# Patient Record
Sex: FEMALE | Race: WHITE | Employment: OTHER | URBAN - METROPOLITAN AREA
[De-identification: names, ages, dates, MRNs, and addresses within clinical notes are randomized per-mention and may not be internally consistent; named-entity substitution may affect disease eponyms.]

---

## 2017-10-20 ENCOUNTER — HOSPITAL ENCOUNTER (OUTPATIENT)
Dept: MAMMOGRAPHY | Age: 52
Discharge: HOME | End: 2017-10-20
Payer: COMMERCIAL

## 2017-10-20 DIAGNOSIS — Z12.39 SCREENING BREAST EXAMINATION: ICD-10-CM

## 2017-10-20 PROCEDURE — 77063 BREAST TOMOSYNTHESIS BI: CPT

## 2019-05-23 ENCOUNTER — HOSPITAL ENCOUNTER (OUTPATIENT)
Dept: MAMMOGRAPHY | Age: 54
Discharge: HOME | End: 2019-05-23
Attending: INTERNAL MEDICINE
Payer: COMMERCIAL

## 2019-05-23 DIAGNOSIS — Z12.39 BREAST SCREENING: ICD-10-CM

## 2019-05-23 PROCEDURE — 77063 BREAST TOMOSYNTHESIS BI: CPT

## 2019-05-24 ENCOUNTER — HOSPITAL ENCOUNTER (OUTPATIENT)
Dept: NON INVASIVE DIAGNOSTICS | Age: 54
Discharge: HOME | End: 2019-05-24
Attending: INTERNAL MEDICINE
Payer: COMMERCIAL

## 2019-05-24 VITALS
DIASTOLIC BLOOD PRESSURE: 80 MMHG | WEIGHT: 151 LBS | SYSTOLIC BLOOD PRESSURE: 130 MMHG | BODY MASS INDEX: 25.78 KG/M2 | HEIGHT: 64 IN

## 2019-05-24 DIAGNOSIS — I31.9 DISEASE OF PERICARDIUM: ICD-10-CM

## 2019-05-24 LAB
ECHO AO ROOT DIAM: 2.9 CM
ECHO AV AREA PEAK VELOCITY: 2.7 CM2
ECHO AV PEAK GRADIENT: 6.4 MMHG
ECHO AV PEAK VELOCITY: 126.68 CM/S
ECHO EST RA PRESSURE: 5 MMHG
ECHO LA AREA 4C: 17.2 CM2
ECHO LA MAJOR AXIS: 2.82 CM
ECHO LA TO AORTIC ROOT RATIO: 0.97
ECHO LA VOL 2C: 41.95 ML (ref 22–52)
ECHO LA VOL 4C: 46.25 ML (ref 22–52)
ECHO LA VOL BP: 48.07 ML (ref 22–52)
ECHO LA VOL/BSA BIPLANE: 27.69 ML/M2 (ref 16–28)
ECHO LA VOLUME INDEX A2C: 24.17 ML/M2 (ref 16–28)
ECHO LA VOLUME INDEX A4C: 26.64 ML/M2 (ref 16–28)
ECHO LV E' LATERAL VELOCITY: 12.59 CM/S
ECHO LV E' SEPTAL VELOCITY: 10.05 CM/S
ECHO LV INTERNAL DIMENSION DIASTOLIC: 4.37 CM (ref 3.9–5.3)
ECHO LV INTERNAL DIMENSION SYSTOLIC: 2.82 CM
ECHO LV IVSD: 0.96 CM (ref 0.6–0.9)
ECHO LV MASS 2D: 157.4 G (ref 67–162)
ECHO LV MASS INDEX 2D: 90.7 G/M2 (ref 43–95)
ECHO LV POSTERIOR WALL DIASTOLIC: 0.95 CM (ref 0.6–0.9)
ECHO LVOT DIAM: 2.11 CM
ECHO LVOT PEAK GRADIENT: 3.9 MMHG
ECHO LVOT PEAK VELOCITY: 98.24 CM/S
ECHO MV A VELOCITY: 31.89 CM/S
ECHO MV AREA PHT: 4.3 CM2
ECHO MV E DECELERATION TIME (DT): 178.1 MS
ECHO MV E VELOCITY: 65.47 CM/S
ECHO MV E/A RATIO: 2.05
ECHO MV E/E' LATERAL: 5.2
ECHO MV E/E' RATIO (AVERAGED): 5.86
ECHO MV E/E' SEPTAL: 6.51
ECHO MV PRESSURE HALF TIME (PHT): 51.6 MS
ECHO PULMONARY ARTERY SYSTOLIC PRESSURE (PASP): 41.6 MMHG
ECHO PV MAX VELOCITY: 88.83 CM/S
ECHO PV PEAK GRADIENT: 3.2 MMHG
ECHO RIGHT VENTRICULAR SYSTOLIC PRESSURE (RVSP): 41.6 MMHG
ECHO RV INTERNAL DIMENSION: 4.56 CM
ECHO RV TAPSE: 2.57 CM (ref 1.5–2)
ECHO TV REGURGITANT MAX VELOCITY: 302.38 CM/S
ECHO TV REGURGITANT PEAK GRADIENT: 36.6 MMHG

## 2019-05-24 PROCEDURE — 93306 TTE W/DOPPLER COMPLETE: CPT

## 2019-06-06 ENCOUNTER — OFFICE VISIT (OUTPATIENT)
Dept: INTERNAL MEDICINE CLINIC | Age: 54
End: 2019-06-06

## 2019-06-06 VITALS
RESPIRATION RATE: 19 BRPM | SYSTOLIC BLOOD PRESSURE: 120 MMHG | DIASTOLIC BLOOD PRESSURE: 70 MMHG | BODY MASS INDEX: 25.73 KG/M2 | HEART RATE: 58 BPM | TEMPERATURE: 97.9 F | WEIGHT: 145.2 LBS | HEIGHT: 63 IN | OXYGEN SATURATION: 98 %

## 2019-06-06 DIAGNOSIS — Z12.11 COLON CANCER SCREENING: ICD-10-CM

## 2019-06-06 DIAGNOSIS — I83.811 VARICOSE VEINS OF RIGHT LOWER EXTREMITY WITH PAIN: ICD-10-CM

## 2019-06-06 DIAGNOSIS — Z12.4 CERVICAL CANCER SCREENING: ICD-10-CM

## 2019-06-06 DIAGNOSIS — R91.1 LUNG NODULE: ICD-10-CM

## 2019-06-06 DIAGNOSIS — Z84.1: ICD-10-CM

## 2019-06-06 DIAGNOSIS — R49.0 HOARSENESS OF VOICE: Primary | ICD-10-CM

## 2019-06-06 RX ORDER — ASPIRIN 81 MG/1
TABLET ORAL DAILY
COMMUNITY

## 2019-06-06 NOTE — PROGRESS NOTES
New Patient Evaluation    Sunny Cordero is a 48 y.o. female. They are here to establish care with the group and me as a primary care provider. she has seen Dr. Margi Cedillo in the past.  She then followed up with Dr. Raleigh Douglass. The patient has chronic pain. Pericarditis in 2016. She went to see Dr. Britney Cervantes. She had resolution of the pericarditis. She     She has had varicose veins in her right leg. Present her whole life. She has seen vascular in the past (Dr. Chemo Forbes). She had venous clot. She had a venous clot two months ago. She  Has a lung nodule--need repeat chest CT. She has some raspiness of her voice. She also notes having a lot of coughing only. Increased mucus production. Possible allergy. Patient Active Problem List    Diagnosis Date Noted    Menopause     Dyspareunia      Current Outpatient Medications   Medication Sig Dispense Refill    aspirin delayed-release 81 mg tablet Take  by mouth daily.  MULTIVITAMIN PO Take  by mouth daily.  CALCIUM CARBONATE/VITAMIN D3 (CALCIUM CHEW PO) Take  by mouth daily.  HYDROcodone-acetaminophen (NORCO) 7.5-325 mg per tablet Take 1 Tab by mouth every four (4) hours as needed for Pain. Max Daily Amount: 6 Tabs. 60 Tab 0    fluticasone-salmeterol (ADVAIR) 500-50 mcg/dose diskus inhaler Take 1 Puff by inhalation two (2) times a day.  1 Inhaler 0     Allergies   Allergen Reactions    Pcn [Penicillins] Other (comments)     Patient reports an reaction about 8 years ago       Past Medical History:   Diagnosis Date    Dyspareunia     Menopause     age 47     Past Surgical History:   Procedure Laterality Date    HX BREAST BIOPSY      can't remember which side needle benign     Family History   Problem Relation Age of Onset    Cancer Mother         lung    Cancer Father         salivary     Heart Disease Father     Lung Disease Father      Social History     Tobacco Use    Smoking status: Never Smoker    Smokeless tobacco: Never Used   Substance Use Topics    Alcohol use: No        Health Maintenance   Topic Date Due    Shingrix Vaccine Age 50> (1 of 2) 07/28/2017    FOBT Q 1 YEAR AGE 50-75  07/28/2017    PAP AKA CERVICAL CYTOLOGY  02/26/2019    Influenza Age 5 to Adult  08/01/2019    BREAST CANCER SCRN MAMMOGRAM  05/23/2021    DTaP/Tdap/Td series (2 - Td) 02/08/2026    Pneumococcal 0-64 years  Aged Out       Review of Systems   Constitutional: Negative. Respiratory: Negative. Cardiovascular: Negative. Genitourinary: Negative. Visit Vitals  /70 (BP 1 Location: Right arm, BP Patient Position: Sitting)   Pulse (!) 58   Temp 97.9 °F (36.6 °C) (Oral)   Resp 19   Ht 5' 3.15\" (1.604 m)   Wt 145 lb 3.2 oz (65.9 kg)   SpO2 98%   BMI 25.60 kg/m²       Physical Exam   Constitutional: She is oriented to person, place, and time. No distress. Cardiovascular: Normal rate and regular rhythm. No murmur heard. Pulmonary/Chest: Effort normal and breath sounds normal.   Neurological: She is alert and oriented to person, place, and time. ASSESSMENT/PLAN    Diagnoses and all orders for this visit:    1. Hoarseness of voice  -     REFERRAL TO ENT-OTOLARYNGOLOGY    2. Lung nodule  -     CT CHEST WO CONT; Future    3. Family history of kidney disease in sister  -     CBC WITH AUTOMATED DIFF  -     LIPID PANEL  -     METABOLIC PANEL, COMPREHENSIVE  -     TSH 3RD GENERATION    4. Colon cancer screening  -     REFERRAL TO GASTROENTEROLOGY    5. Varicose veins of right lower extremity with pain  -     REFERRAL TO VASCULAR SURGERY    6. Cervical cancer screening  -     REFERRAL TO GYNECOLOGY      Follow-up and Dispositions    · Return in about 6 weeks (around 7/18/2019) for Full Physical - 30 minutes appointment.             -Discussed with the patient to continue the current plan of care. We will obtain baseline labwork and determine if any adjustments need to be done.   We will also await the records of the previous PCP to ascertain further details of the patient's history. The patient agrees with and understands the plan of care. All questions have been answered.

## 2019-06-06 NOTE — PATIENT INSTRUCTIONS
Varicose Veins: Care Instructions Your Care Instructions Varicose veins are twisted, enlarged veins near the surface of the skin. They develop most often in the legs and ankles. Some people may be more likely than others to get varicose veins because of aging or hormone changes or because a parent has them. Being overweight or pregnant can make varicose veins worse. Jobs that require standing for long periods of time also can make them worse. Follow-up care is a key part of your treatment and safety. Be sure to make and go to all appointments, and call your doctor if you are having problems. It's also a good idea to know your test results and keep a list of the medicines you take. How can you care for yourself at home? · Wear compression stockings during the day to help relieve symptoms. They improve blood flow and are the main treatment for varicose veins. Talk to your doctor about which ones to get and where to get them. · Prop up your legs at or above the level of your heart when possible. This helps keep the blood from pooling in your lower legs and improves blood flow to the rest of your body. · Avoid sitting and standing for long periods. This puts added stress on your veins. · Get regular exercise, and control your weight. Walk, bicycle, or swim to improve blood flow in your legs. · If you bump your leg so hard that you know it is likely to bruise, prop up your leg and put ice or a cold pack on the area for 10 to 20 minutes at a time. Try to do this every 1 to 2 hours for the next 3 days (when you are awake) or until the swelling goes down. Put a thin cloth between the ice and your skin. · If you cut or scratch the skin over a vein, it may bleed a lot. Prop up your leg and apply firm pressure with a clean bandage over the site of the bleeding. Continue to apply pressure for a full 15 minutes. Do not check sooner to see if the bleeding has stopped.  If the bleeding has not stopped after 15 minutes, apply pressure again for another 15 minutes. You can repeat this up to 3 times for a total of 45 minutes. If you have a blood clot in a varicose vein, you may have tenderness and swelling over the vein. The vein may feel firm. Be sure to call your doctor right away if you have these symptoms. If your doctor has told you how to care for the clot, follow his or her instructions. Care may include the following: · Prop up your leg and apply heat with a warm, damp cloth or a heating pad set on low (put a towel or cloth between your leg and the heating pad to prevent burns). · Ask your doctor if you can take an over-the-counter pain medicine, such as acetaminophen (Tylenol), ibuprofen (Advil, Motrin), or naproxen (Aleve). Be safe with medicines. Read and follow all instructions on the label. When should you call for help? Call 911 anytime you think you may need emergency care. For example, call if: 
  · You have sudden chest pain and shortness of breath, or you cough up blood.  
 Call your doctor now or seek immediate medical care if: 
  · You have signs of a blood clot, such as: 
? Pain in your calf, back of the knee, thigh, or groin. ? Redness and swelling in your leg or groin.  
  · A varicose vein begins to bleed and you cannot stop it.  
  · You have a tender lump in your leg.  
  · You get an open sore.  
 Watch closely for changes in your health, and be sure to contact your doctor if: 
  · Your varicose vein symptoms do not improve with home treatment. Where can you learn more? Go to http://chelo-harriet.info/. Enter H277 in the search box to learn more about \"Varicose Veins: Care Instructions. \" Current as of: September 26, 2018 Content Version: 11.9 © 6990-2716 [employer]. Care instructions adapted under license by [employer] (which disclaims liability or warranty for this information).  If you have questions about a medical condition or this instruction, always ask your healthcare professional. [place] [address] any warranty or liability for your use of this information.

## 2019-06-08 LAB
ALBUMIN SERPL-MCNC: 4.4 G/DL (ref 3.5–5.5)
ALBUMIN/GLOB SERPL: 2 {RATIO} (ref 1.2–2.2)
ALP SERPL-CCNC: 83 IU/L (ref 39–117)
ALT SERPL-CCNC: 26 IU/L (ref 0–32)
AST SERPL-CCNC: 29 IU/L (ref 0–40)
BASOPHILS # BLD AUTO: 0 X10E3/UL (ref 0–0.2)
BASOPHILS NFR BLD AUTO: 1 %
BILIRUB SERPL-MCNC: 0.5 MG/DL (ref 0–1.2)
BUN SERPL-MCNC: 11 MG/DL (ref 6–24)
BUN/CREAT SERPL: 14 (ref 9–23)
CALCIUM SERPL-MCNC: 9.6 MG/DL (ref 8.7–10.2)
CHLORIDE SERPL-SCNC: 103 MMOL/L (ref 96–106)
CHOLEST SERPL-MCNC: 229 MG/DL (ref 100–199)
CO2 SERPL-SCNC: 24 MMOL/L (ref 20–29)
CREAT SERPL-MCNC: 0.78 MG/DL (ref 0.57–1)
EOSINOPHIL # BLD AUTO: 0.1 X10E3/UL (ref 0–0.4)
EOSINOPHIL NFR BLD AUTO: 2 %
ERYTHROCYTE [DISTWIDTH] IN BLOOD BY AUTOMATED COUNT: 13.7 % (ref 12.3–15.4)
GLOBULIN SER CALC-MCNC: 2.2 G/DL (ref 1.5–4.5)
GLUCOSE SERPL-MCNC: 86 MG/DL (ref 65–99)
HCT VFR BLD AUTO: 38.5 % (ref 34–46.6)
HDLC SERPL-MCNC: 57 MG/DL
HGB BLD-MCNC: 12.8 G/DL (ref 11.1–15.9)
IMM GRANULOCYTES # BLD AUTO: 0 X10E3/UL (ref 0–0.1)
IMM GRANULOCYTES NFR BLD AUTO: 0 %
LDLC SERPL CALC-MCNC: 155 MG/DL (ref 0–99)
LYMPHOCYTES # BLD AUTO: 1.5 X10E3/UL (ref 0.7–3.1)
LYMPHOCYTES NFR BLD AUTO: 36 %
MCH RBC QN AUTO: 29.3 PG (ref 26.6–33)
MCHC RBC AUTO-ENTMCNC: 33.2 G/DL (ref 31.5–35.7)
MCV RBC AUTO: 88 FL (ref 79–97)
MONOCYTES # BLD AUTO: 0.2 X10E3/UL (ref 0.1–0.9)
MONOCYTES NFR BLD AUTO: 6 %
NEUTROPHILS # BLD AUTO: 2.4 X10E3/UL (ref 1.4–7)
NEUTROPHILS NFR BLD AUTO: 55 %
PLATELET # BLD AUTO: 305 X10E3/UL (ref 150–450)
POTASSIUM SERPL-SCNC: 4.2 MMOL/L (ref 3.5–5.2)
PROT SERPL-MCNC: 6.6 G/DL (ref 6–8.5)
RBC # BLD AUTO: 4.37 X10E6/UL (ref 3.77–5.28)
SODIUM SERPL-SCNC: 141 MMOL/L (ref 134–144)
TRIGL SERPL-MCNC: 87 MG/DL (ref 0–149)
TSH SERPL DL<=0.005 MIU/L-ACNC: 3.74 UIU/ML (ref 0.45–4.5)
VLDLC SERPL CALC-MCNC: 17 MG/DL (ref 5–40)
WBC # BLD AUTO: 4.3 X10E3/UL (ref 3.4–10.8)

## 2019-06-21 ENCOUNTER — TELEPHONE (OUTPATIENT)
Dept: INTERNAL MEDICINE CLINIC | Age: 54
End: 2019-06-21

## 2019-06-21 NOTE — TELEPHONE ENCOUNTER
Spoke with Lucille with [employer]. [facility] for [facility] scan is out network. Need to redirect to another facility or clinical reason why test is needed. . Insurance will approve if test done at [facility]. Authorization will be faxed or call with approval codes. At that time can call and schedule appointment.     Patient notified

## 2019-08-01 ENCOUNTER — OFFICE VISIT (OUTPATIENT)
Dept: INTERNAL MEDICINE CLINIC | Age: 54
End: 2019-08-01

## 2019-08-01 VITALS
DIASTOLIC BLOOD PRESSURE: 78 MMHG | HEIGHT: 63 IN | BODY MASS INDEX: 25.8 KG/M2 | HEART RATE: 78 BPM | RESPIRATION RATE: 16 BRPM | SYSTOLIC BLOOD PRESSURE: 104 MMHG | TEMPERATURE: 98 F | OXYGEN SATURATION: 97 % | WEIGHT: 145.6 LBS

## 2019-08-01 DIAGNOSIS — R07.81 RIB PAIN ON LEFT SIDE: Primary | ICD-10-CM

## 2019-08-01 RX ORDER — GABAPENTIN 100 MG/1
100 CAPSULE ORAL 3 TIMES DAILY
Qty: 90 CAP | Refills: 0 | Status: SHIPPED | OUTPATIENT
Start: 2019-08-01

## 2019-08-01 NOTE — PROGRESS NOTES
Chief Complaint   Patient presents with    Rib Pain     left side under breast     1. Have you been to the ER, urgent care clinic since your last visit? Hospitalized since your last visit? No    2. Have you seen or consulted any other health care providers outside of the [address] [place] [address] since your last visit? Include any pap smears or colon screening. No    complains of left side rib pain under breast. Difficulty taking deep breath. Sharp pain. Tried Advil, OTC Ibuprofen.  Cough-mucus/thick yellow

## 2019-08-01 NOTE — PROGRESS NOTES
Acute Care Note    Judith Mendoza is 48 y.o. female. she presents for evaluation of Rib Pain (left side under breast)      The patient has ongoing and persistent L sided rib pain. She notes that this has been ongoing and off and on for several years. She has had a pericardial effusion in the past however she has been followed by cardiology with an echo done in late May with no evidence of effusion. She has had complaints of chest pain since at least 2015. Prior to Admission medications    Medication Sig Start Date End Date Taking? Authorizing Provider   MULTIVITAMIN PO Take  by mouth daily. Yes Provider, Historical   CALCIUM CARBONATE/VITAMIN D3 (CALCIUM CHEW PO) Take  by mouth daily. Yes Provider, Historical   aspirin delayed-release 81 mg tablet Take  by mouth daily. Provider, Historical   HYDROcodone-acetaminophen (NORCO) 7.5-325 mg per tablet Take 1 Tab by mouth every four (4) hours as needed for Pain. Max Daily Amount: 6 Tabs. 10/10/16   Danita Oliver MD   fluticasone-salmeterol (ADVAIR) 500-50 mcg/dose diskus inhaler Take 1 Puff by inhalation two (2) times a day. 5/9/16   Danita Oliver MD         Patient Active Problem List   Diagnosis Code    Menopause Z78.0    Dyspareunia [id number]         Review of Systems   Constitutional: Negative. Respiratory: Negative. Cardiovascular: Positive for chest pain. Gastrointestinal: Negative. Visit Vitals  /78 (BP 1 Location: Right arm, BP Patient Position: Sitting)   Pulse 78   Temp 98 °F (36.7 °C) (Oral)   Resp 16   Ht 5' 3.15\" (1.604 m)   Wt 145 lb 9.6 oz (66 kg)   SpO2 97%   BMI 25.67 kg/m²       Physical Exam   Constitutional: She appears well-developed and well-nourished. Cardiovascular: Normal rate and regular rhythm. Pulmonary/Chest: Effort normal and breath sounds normal.   Neurological: She is alert. ASSESSMENT/PLAN  Diagnoses and all orders for this visit:    1.  Rib pain on left side  -     XR CHEST PA LAT; Future  -     gabapentin (NEURONTIN) 100 mg capsule; Take 1 Cap by mouth three (3) times daily. Max Daily Amount: 300 mg. Advised the patient to call back or return to office if symptoms worsen/change/persist.   Discussed expected course/resolution/complications of diagnosis in detail with patient. Medication risks/benefits/costs/interactions/alternatives discussed with patient. The patient was given an after visit summary which includes diagnoses, current medications, & vitals. They expressed understanding with the diagnosis and plan.

## 2019-08-06 ENCOUNTER — TELEPHONE (OUTPATIENT)
Dept: INTERNAL MEDICINE CLINIC | Age: 54
End: 2019-08-06

## 2019-08-06 NOTE — TELEPHONE ENCOUNTER
Confirmed with patient CT for tomorrow approved per insurance. Patient would like to know if she need to start cholesterol medication per lab results. Informed patient will forward and call back. Confirmed CPE 09/30/2019 @ 3pm.Pt verbalized understanding.

## 2019-08-06 NOTE — PROGRESS NOTES
Spoke with scheduling, correct test ordered and authorization received from insurance. Patient scheduled for tomorrow. Tried to notify patient.  No answer, JASON

## 2019-08-07 ENCOUNTER — HOSPITAL ENCOUNTER (OUTPATIENT)
Dept: CT IMAGING | Age: 54
Discharge: HOME | End: 2019-08-07
Payer: COMMERCIAL

## 2019-08-07 DIAGNOSIS — R91.1 LUNG NODULE: ICD-10-CM

## 2019-08-07 PROCEDURE — 71250 CT THORAX DX C-: CPT

## 2019-08-14 ENCOUNTER — TELEPHONE (OUTPATIENT)
Dept: INTERNAL MEDICINE CLINIC | Age: 54
End: 2019-08-14

## 2019-08-14 NOTE — TELEPHONE ENCOUNTER
Patient requesting results. informed patient per provider results sent through [address] today. Advised to reach out to insurance to find pulmonary in-network to schedule for further evaluation. Patient verbalized understanding.

## 2019-09-13 ENCOUNTER — TELEPHONE (OUTPATIENT)
Dept: INTERNAL MEDICINE CLINIC | Age: 54
End: 2019-09-13

## 2019-09-13 NOTE — TELEPHONE ENCOUNTER
Patient explained squirrel nibble on 's thumb and wanted to know if need evaluation. Patient reached out to animal control and confirmed rabies not known to squirrels unless in contact with wildlife. Advised patient if  develops, swelling, numbness, redness, itching, fever, go to ER. She confirmed tetanus received in last 10 years.

## 2019-09-19 ENCOUNTER — APPOINTMENT (OUTPATIENT)
Dept: GENERAL RADIOLOGY | Age: 54
End: 2019-09-19
Attending: STUDENT IN AN ORGANIZED HEALTH CARE EDUCATION/TRAINING PROGRAM
Payer: COMMERCIAL

## 2019-09-19 ENCOUNTER — HOSPITAL ENCOUNTER (EMERGENCY)
Age: 54
Discharge: HOME | End: 2019-09-19
Attending: STUDENT IN AN ORGANIZED HEALTH CARE EDUCATION/TRAINING PROGRAM
Payer: COMMERCIAL

## 2019-09-19 VITALS
DIASTOLIC BLOOD PRESSURE: 103 MMHG | OXYGEN SATURATION: 100 % | TEMPERATURE: 97.6 F | RESPIRATION RATE: 20 BRPM | HEART RATE: 79 BPM | SYSTOLIC BLOOD PRESSURE: 118 MMHG | WEIGHT: 139.77 LBS | BODY MASS INDEX: 24.64 KG/M2

## 2019-09-19 DIAGNOSIS — S52.502A CLOSED FRACTURE OF DISTAL END OF LEFT RADIUS, UNSPECIFIED FRACTURE MORPHOLOGY, INITIAL ENCOUNTER: Primary | ICD-10-CM

## 2019-09-19 DIAGNOSIS — S52.612A CLOSED DISPLACED FRACTURE OF STYLOID PROCESS OF LEFT ULNA, INITIAL ENCOUNTER: ICD-10-CM

## 2019-09-19 PROCEDURE — 99284 EMERGENCY DEPT VISIT MOD MDM: CPT

## 2019-09-19 PROCEDURE — 74011250636 HC RX REV CODE- 250/636: Performed by: STUDENT IN AN ORGANIZED HEALTH CARE EDUCATION/TRAINING PROGRAM

## 2019-09-19 PROCEDURE — 75810000053 HC SPLINT APPLICATION

## 2019-09-19 PROCEDURE — 96372 THER/PROPH/DIAG INJ SC/IM: CPT

## 2019-09-19 PROCEDURE — A4565 SLINGS: HCPCS

## 2019-09-19 PROCEDURE — 73110 X-RAY EXAM OF WRIST: CPT

## 2019-09-19 PROCEDURE — 99283 EMERGENCY DEPT VISIT LOW MDM: CPT

## 2019-09-19 PROCEDURE — 74011250637 HC RX REV CODE- 250/637: Performed by: STUDENT IN AN ORGANIZED HEALTH CARE EDUCATION/TRAINING PROGRAM

## 2019-09-19 RX ORDER — HYDROCODONE BITARTRATE AND ACETAMINOPHEN 7.5; 325 MG/1; MG/1
1 TABLET ORAL ONCE
Status: COMPLETED | OUTPATIENT
Start: 2019-09-19 | End: 2019-09-19

## 2019-09-19 RX ORDER — HYDROCODONE BITARTRATE AND ACETAMINOPHEN 5; 325 MG/1; MG/1
1 TABLET ORAL
Qty: 12 TAB | Refills: 0 | Status: SHIPPED | OUTPATIENT
Start: 2019-09-19 | End: 2019-09-22

## 2019-09-19 RX ORDER — ACETAMINOPHEN 325 MG/1
650 TABLET ORAL ONCE
Status: COMPLETED | OUTPATIENT
Start: 2019-09-19 | End: 2019-09-19

## 2019-09-19 RX ORDER — ONDANSETRON 4 MG/1
4 TABLET, ORALLY DISINTEGRATING ORAL
Qty: 9 TAB | Refills: 0 | Status: SHIPPED | OUTPATIENT
Start: 2019-09-19

## 2019-09-19 RX ORDER — KETOROLAC TROMETHAMINE 30 MG/ML
30 INJECTION, SOLUTION INTRAMUSCULAR; INTRAVENOUS ONCE
Status: COMPLETED | OUTPATIENT
Start: 2019-09-19 | End: 2019-09-19

## 2019-09-19 RX ADMIN — HYDROCODONE BITARTRATE AND ACETAMINOPHEN 1 TABLET: 7.5; 325 TABLET ORAL at 11:10

## 2019-09-19 RX ADMIN — KETOROLAC TROMETHAMINE 30 MG: 30 INJECTION, SOLUTION INTRAMUSCULAR; INTRAVENOUS at 12:20

## 2019-09-19 RX ADMIN — ACETAMINOPHEN 650 MG: 325 TABLET, FILM COATED ORAL at 12:20

## 2019-09-19 NOTE — DISCHARGE INSTRUCTIONS
Patient Education        Broken Arm: Care Instructions  Your Care Instructions  Fractures can range from a small, hairline crack, to a bone or bones broken into two or more pieces. Your treatment depends on how bad the break is. Your doctor may have put your arm in a splint or cast to allow it to heal or to keep it stable until you see another doctor. It may take weeks or months for your arm to heal. You can help your arm heal with some care at home. You heal best when you take good care of yourself. Eat a variety of healthy foods, and don't smoke. You may have had a sedative to help you relax. You may be unsteady after having sedation. It can take a few hours for the medicine's effects to wear off. Common side effects of sedation include nausea, vomiting, and feeling sleepy or tired. The doctor has checked you carefully, but problems can develop later. If you notice any problems or new symptoms, get medical treatment right away. Follow-up care is a key part of your treatment and safety. Be sure to make and go to all appointments, and call your doctor if you are having problems. It's also a good idea to know your test results and keep a list of the medicines you take. How can you care for yourself at home? · If the doctor gave you a sedative:  ? For 24 hours, don't do anything that requires attention to detail, such as going to work, making important decisions, or signing any legal documents. It takes time for the medicine's effects to completely wear off.  ? For your safety, do not drive or operate any machinery that could be dangerous. Wait until the medicine wears off and you can think clearly and react easily. · Put ice or a cold pack on your arm for 10 to 20 minutes at a time. Try to do this every 1 to 2 hours for the next 3 days (when you are awake). Put a thin cloth between the ice and your cast or splint. Keep the cast or splint dry. · Follow the cast care instructions your doctor gives you.  If you have a splint, do not take it off unless your doctor tells you to. · Be safe with medicines. Take pain medicines exactly as directed. ? If the doctor gave you a prescription medicine for pain, take it as prescribed. ? If you are not taking a prescription pain medicine, ask your doctor if you can take an over-the-counter medicine. · Prop up your arm on pillows when you sit or lie down in the first few days after the injury. Keep the arm higher than the level of your heart. This will help reduce swelling. · Follow instructions for exercises to keep your arm strong. · Wiggle your fingers and wrist often to reduce swelling and stiffness. When should you call for help? Call 911 anytime you think you may need emergency care. For example, call if:    · You are very sleepy and you have trouble waking up.    Call your doctor now or seek immediate medical care if:    · You have new or worse nausea or vomiting.     · You have new or worse pain.     · Your hand or fingers are cool or pale or change color.     · Your cast or splint feels too tight.     · You have tingling, weakness, or numbness in your hand or fingers.    Watch closely for changes in your health, and be sure to contact your doctor if:    · You do not get better as expected.     · You have problems with your cast or splint. Where can you learn more? Go to http://chelo-harriet.info/. Enter P157 in the search box to learn more about \"Broken Arm: Care Instructions. \"  Current as of: September 20, 2018  Content Version: 12.1  © 0878-8680 [employer]. Care instructions adapted under license by [employer] (which disclaims liability or warranty for this information). If you have questions about a medical condition or this instruction, always ask your healthcare professional. [address] any warranty or liability for your use of this information.

## 2019-09-19 NOTE — ED PROVIDER NOTES
Patient is a 42-year-old female with no significant past medical history who resents to the emergency department with a chief complaint of left wrist pain. She had a mechanical fall in her kitchen approximately 30 minutes ago, landing outstretched on her left hand. Since then severe pain and unable to fully move the wrist secondary to pain. No previous injuries to the left wrist.  Rest has not helped. She did not take anything at home prior to arrival.  Pain currently 10 out of 10. No other injuries reported including head injury, neck pain, pain in other extremities. Fall   Pertinent negatives include no abdominal pain, no vomiting and no headaches.         Past Medical History:   Diagnosis Date    Dyspareunia     Menopause     age 47       Past Surgical History:   Procedure Laterality Date    HX BREAST BIOPSY      can't remember which side needle benign         Family History:   Problem Relation Age of Onset    Cancer Mother         lung    Cancer Father         salivary     Heart Disease Father     Lung Disease Father        Social History     Socioeconomic History    Marital status:      Spouse name: Not on file    Number of children: Not on file    Years of education: Not on file    Highest education level: Not on file   Occupational History    Not on file   Social Needs    Financial resource strain: Not on file    Food insecurity:     Worry: Not on file     Inability: Not on file    Transportation needs:     Medical: Not on file     Non-medical: Not on file   Tobacco Use    Smoking status: Never Smoker    Smokeless tobacco: Never Used   Substance and Sexual Activity    Alcohol use: No    Drug use: No    Sexual activity: Never     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   Lifestyle    Physical activity:     Days per week: Not on file     Minutes per session: Not on file    Stress: Not on file   Relationships    Social connections:     Talks on phone: Not on file     Gets together: Not on file     Attends Jew service: Not on file     Active member of club or organization: Not on file     Attends meetings of clubs or organizations: Not on file     Relationship status: Not on file    Intimate partner violence:     Fear of current or ex partner: Not on file     Emotionally abused: Not on file     Physically abused: Not on file     Forced sexual activity: Not on file   Other Topics Concern    Not on file   Social History Narrative    Not on file         ALLERGIES: Pcn [penicillins]    Review of Systems   Respiratory: Negative for cough and shortness of breath. Cardiovascular: Negative for chest pain. Gastrointestinal: Negative for abdominal pain and vomiting. Musculoskeletal: Positive for arthralgias and joint swelling. Negative for back pain. Skin: Negative for wound. Neurological: Negative for weakness and headaches. Psychiatric/Behavioral: Negative for confusion. All other systems reviewed and are negative. Vitals:    09/19/19 1059   BP: (!) 118/103   Pulse: 79   Resp: 20   Temp: 97.6 °F (36.4 °C)   SpO2: 100%   Weight: 63.4 kg (139 lb 12.4 oz)            Physical Exam   Constitutional: She is oriented to person, place, and time. She appears well-developed. No distress. HENT:   Head: Normocephalic and atraumatic. Right Ear: External ear normal.   Left Ear: External ear normal.   Nose: Nose normal.   Mouth/Throat: Oropharynx is clear and moist.   Neck: Normal range of motion. Neck supple. Cardiovascular: Normal rate, regular rhythm and normal heart sounds. Pulmonary/Chest: Effort normal and breath sounds normal. No respiratory distress. Abdominal: Soft. There is no tenderness. There is no guarding. Musculoskeletal:        Left wrist: She exhibits decreased range of motion, tenderness, bony tenderness, swelling and deformity. She exhibits no laceration. Neurological: She is alert and oriented to person, place, and time.  She exhibits normal muscle tone. Skin: Skin is warm and dry. [facility]       Procedures    Assessment and plan: This is a 57-year-old female who injured her left wrist after a ground-level fall, likely fracturing her distal radius. Will obtain x-ray to confirm. Immobilization, pain control, Ortho consultation. 11:46 AM  I consulted Dr. Charmaine Meyers surgeon on-call, and we discussed the case via [facility]. He recommends immobilization in splint and follow up in the office with Dr. Carlos Acharya. Splint applied by RN and evaluated by Jazmine Rand MD.  Neurovascular status intact post splint application.  Desired position maintained.

## 2019-09-25 ENCOUNTER — ANESTHESIA (OUTPATIENT)
Dept: MEDSURG UNIT | Age: 54
End: 2019-09-25
Payer: COMMERCIAL

## 2019-09-25 ENCOUNTER — HOSPITAL ENCOUNTER (OUTPATIENT)
Age: 54
Setting detail: OUTPATIENT SURGERY
Discharge: HOME | End: 2019-09-25
Attending: ORTHOPAEDIC SURGERY | Admitting: ORTHOPAEDIC SURGERY
Payer: COMMERCIAL

## 2019-09-25 ENCOUNTER — ANESTHESIA EVENT (OUTPATIENT)
Dept: MEDSURG UNIT | Age: 54
End: 2019-09-25
Payer: COMMERCIAL

## 2019-09-25 VITALS
BODY MASS INDEX: 25.69 KG/M2 | RESPIRATION RATE: 14 BRPM | TEMPERATURE: 97.7 F | HEIGHT: 63 IN | WEIGHT: 145 LBS | SYSTOLIC BLOOD PRESSURE: 132 MMHG | HEART RATE: 60 BPM | DIASTOLIC BLOOD PRESSURE: 81 MMHG | OXYGEN SATURATION: 100 %

## 2019-09-25 DIAGNOSIS — S52.502A CLOSED FRACTURE OF DISTAL END OF LEFT RADIUS, INITIAL ENCOUNTER: Primary | ICD-10-CM

## 2019-09-25 LAB — HGB BLD-MCNC: 12.9 G/DL (ref 11.5–16)

## 2019-09-25 PROCEDURE — 77030008829 HC WRE K ACMD -B: Performed by: ORTHOPAEDIC SURGERY

## 2019-09-25 PROCEDURE — 76210000034 HC AMBSU PH I REC 0.5 TO 1 HR: Performed by: ORTHOPAEDIC SURGERY

## 2019-09-25 PROCEDURE — C1713 ANCHOR/SCREW BN/BN,TIS/BN: HCPCS | Performed by: ORTHOPAEDIC SURGERY

## 2019-09-25 PROCEDURE — 76030000001 HC AMB SURG OR TIME 1 TO 1.5: Performed by: ORTHOPAEDIC SURGERY

## 2019-09-25 PROCEDURE — 76060000062 HC AMB SURG ANES 1 TO 1.5 HR: Performed by: ORTHOPAEDIC SURGERY

## 2019-09-25 PROCEDURE — 77030031139 HC SUT VCRL2 J&J -A: Performed by: ORTHOPAEDIC SURGERY

## 2019-09-25 PROCEDURE — 77030040361 HC SLV COMPR DVT MDII -B: Performed by: ORTHOPAEDIC SURGERY

## 2019-09-25 PROCEDURE — 77030018836 HC SOL IRR NACL ICUM -A: Performed by: ORTHOPAEDIC SURGERY

## 2019-09-25 PROCEDURE — 77030020782 HC GWN BAIR PAWS FLX 3M -B

## 2019-09-25 PROCEDURE — 74011000272 HC RX REV CODE- 272: Performed by: ORTHOPAEDIC SURGERY

## 2019-09-25 PROCEDURE — 77030003737 HC BIT DRL ACMD -C: Performed by: ORTHOPAEDIC SURGERY

## 2019-09-25 PROCEDURE — 74011250636 HC RX REV CODE- 250/636

## 2019-09-25 PROCEDURE — 85018 HEMOGLOBIN: CPT

## 2019-09-25 PROCEDURE — 74011250636 HC RX REV CODE- 250/636: Performed by: ANESTHESIOLOGY

## 2019-09-25 PROCEDURE — 74011250636 HC RX REV CODE- 250/636: Performed by: NURSE ANESTHETIST, CERTIFIED REGISTERED

## 2019-09-25 PROCEDURE — 77030003601 HC NDL NRV BLK BBMI -A

## 2019-09-25 PROCEDURE — 74011000250 HC RX REV CODE- 250: Performed by: ORTHOPAEDIC SURGERY

## 2019-09-25 PROCEDURE — 77030002916 HC SUT ETHLN J&J -A: Performed by: ORTHOPAEDIC SURGERY

## 2019-09-25 PROCEDURE — 74011250637 HC RX REV CODE- 250/637: Performed by: ANESTHESIOLOGY

## 2019-09-25 PROCEDURE — 77030033681 HC SPLNT P-CUT SAF BSNM -A: Performed by: ORTHOPAEDIC SURGERY

## 2019-09-25 PROCEDURE — 77030040356 HC CORD BPLR FRCP COVD -A: Performed by: ORTHOPAEDIC SURGERY

## 2019-09-25 PROCEDURE — A4565 SLINGS: HCPCS

## 2019-09-25 PROCEDURE — 77030020754 HC CUF TRNQT 2BLA STRY -B: Performed by: ORTHOPAEDIC SURGERY

## 2019-09-25 DEVICE — 2.3MM X 18MM LOCKING CORTICAL SCREW
Type: IMPLANTABLE DEVICE | Site: WRIST | Status: FUNCTIONAL
Brand: ACUMED

## 2019-09-25 DEVICE — ACU-LOC® 2 VDR PROX PLT, NARROW, L
Type: IMPLANTABLE DEVICE | Site: WRIST | Status: FUNCTIONAL
Brand: ACUMED

## 2019-09-25 DEVICE — 2.3MM X 20MM LOCKING CORTICAL SCREW
Type: IMPLANTABLE DEVICE | Site: WRIST | Status: FUNCTIONAL
Brand: ACUMED

## 2019-09-25 DEVICE — 2.3MM X 18MM LOCKING CORTICAL PEG
Type: IMPLANTABLE DEVICE | Site: WRIST | Status: FUNCTIONAL
Brand: ACUMED

## 2019-09-25 DEVICE — 3.5MM X 10MM NON-LOCKING HEXALOBE SCREW
Type: IMPLANTABLE DEVICE | Site: WRIST | Status: FUNCTIONAL
Brand: ACUMED

## 2019-09-25 DEVICE — 3.5MM X 12MM LOCKING HEXALOBE SCREW
Type: IMPLANTABLE DEVICE | Site: WRIST | Status: FUNCTIONAL
Brand: ACUMED

## 2019-09-25 DEVICE — 3.5MM X 10MM LOCKING HEXALOBE SCREW
Type: IMPLANTABLE DEVICE | Site: WRIST | Status: FUNCTIONAL
Brand: ACUMED

## 2019-09-25 RX ORDER — SODIUM CHLORIDE 9 MG/ML
25 INJECTION, SOLUTION INTRAVENOUS CONTINUOUS
Status: DISCONTINUED | OUTPATIENT
Start: 2019-09-25 | End: 2019-09-25 | Stop reason: HOSPADM

## 2019-09-25 RX ORDER — HYDROMORPHONE HYDROCHLORIDE 1 MG/ML
0.2 INJECTION, SOLUTION INTRAMUSCULAR; INTRAVENOUS; SUBCUTANEOUS
Status: DISCONTINUED | OUTPATIENT
Start: 2019-09-25 | End: 2019-09-25 | Stop reason: HOSPADM

## 2019-09-25 RX ORDER — PROPOFOL 10 MG/ML
INJECTION, EMULSION INTRAVENOUS AS NEEDED
Status: DISCONTINUED | OUTPATIENT
Start: 2019-09-25 | End: 2019-09-25 | Stop reason: HOSPADM

## 2019-09-25 RX ORDER — FENTANYL CITRATE 50 UG/ML
INJECTION, SOLUTION INTRAMUSCULAR; INTRAVENOUS AS NEEDED
Status: DISCONTINUED | OUTPATIENT
Start: 2019-09-25 | End: 2019-09-25 | Stop reason: HOSPADM

## 2019-09-25 RX ORDER — SODIUM CHLORIDE, SODIUM LACTATE, POTASSIUM CHLORIDE, CALCIUM CHLORIDE 600; 310; 30; 20 MG/100ML; MG/100ML; MG/100ML; MG/100ML
INJECTION, SOLUTION INTRAVENOUS
Status: DISCONTINUED | OUTPATIENT
Start: 2019-09-25 | End: 2019-09-25 | Stop reason: HOSPADM

## 2019-09-25 RX ORDER — MIDAZOLAM HYDROCHLORIDE 1 MG/ML
0.5 INJECTION, SOLUTION INTRAMUSCULAR; INTRAVENOUS
Status: DISCONTINUED | OUTPATIENT
Start: 2019-09-25 | End: 2019-09-25 | Stop reason: HOSPADM

## 2019-09-25 RX ORDER — ROPIVACAINE HYDROCHLORIDE 5 MG/ML
INJECTION, SOLUTION EPIDURAL; INFILTRATION; PERINEURAL
Status: COMPLETED | OUTPATIENT
Start: 2019-09-25 | End: 2019-09-25

## 2019-09-25 RX ORDER — MORPHINE SULFATE 10 MG/ML
2 INJECTION, SOLUTION INTRAMUSCULAR; INTRAVENOUS
Status: DISCONTINUED | OUTPATIENT
Start: 2019-09-25 | End: 2019-09-25 | Stop reason: HOSPADM

## 2019-09-25 RX ORDER — HYDROCODONE BITARTRATE AND ACETAMINOPHEN 5; 325 MG/1; MG/1
1 TABLET ORAL AS NEEDED
Status: DISCONTINUED | OUTPATIENT
Start: 2019-09-25 | End: 2019-09-25 | Stop reason: HOSPADM

## 2019-09-25 RX ORDER — MIDAZOLAM HYDROCHLORIDE 1 MG/ML
1 INJECTION, SOLUTION INTRAMUSCULAR; INTRAVENOUS AS NEEDED
Status: DISCONTINUED | OUTPATIENT
Start: 2019-09-25 | End: 2019-09-25 | Stop reason: HOSPADM

## 2019-09-25 RX ORDER — DIPHENHYDRAMINE HYDROCHLORIDE 50 MG/ML
12.5 INJECTION, SOLUTION INTRAMUSCULAR; INTRAVENOUS AS NEEDED
Status: DISCONTINUED | OUTPATIENT
Start: 2019-09-25 | End: 2019-09-25 | Stop reason: HOSPADM

## 2019-09-25 RX ORDER — HYDROCODONE BITARTRATE AND ACETAMINOPHEN 5; 325 MG/1; MG/1
1-2 TABLET ORAL
Qty: 20 TAB | Refills: 0 | Status: SHIPPED | OUTPATIENT
Start: 2019-09-25 | End: 2019-09-30

## 2019-09-25 RX ORDER — CEFAZOLIN SODIUM 1 G/3ML
INJECTION, POWDER, FOR SOLUTION INTRAMUSCULAR; INTRAVENOUS AS NEEDED
Status: DISCONTINUED | OUTPATIENT
Start: 2019-09-25 | End: 2019-09-25 | Stop reason: HOSPADM

## 2019-09-25 RX ORDER — FENTANYL CITRATE 50 UG/ML
25 INJECTION, SOLUTION INTRAMUSCULAR; INTRAVENOUS
Status: DISCONTINUED | OUTPATIENT
Start: 2019-09-25 | End: 2019-09-25 | Stop reason: HOSPADM

## 2019-09-25 RX ORDER — ACETAMINOPHEN 325 MG/1
650 TABLET ORAL ONCE
Status: COMPLETED | OUTPATIENT
Start: 2019-09-25 | End: 2019-09-25

## 2019-09-25 RX ORDER — SODIUM CHLORIDE, SODIUM LACTATE, POTASSIUM CHLORIDE, CALCIUM CHLORIDE 600; 310; 30; 20 MG/100ML; MG/100ML; MG/100ML; MG/100ML
1000 INJECTION, SOLUTION INTRAVENOUS CONTINUOUS
Status: DISCONTINUED | OUTPATIENT
Start: 2019-09-25 | End: 2019-09-25 | Stop reason: HOSPADM

## 2019-09-25 RX ORDER — MIDAZOLAM HYDROCHLORIDE 1 MG/ML
INJECTION, SOLUTION INTRAMUSCULAR; INTRAVENOUS AS NEEDED
Status: DISCONTINUED | OUTPATIENT
Start: 2019-09-25 | End: 2019-09-25 | Stop reason: HOSPADM

## 2019-09-25 RX ORDER — PROPOFOL 10 MG/ML
INJECTION, EMULSION INTRAVENOUS
Status: DISCONTINUED | OUTPATIENT
Start: 2019-09-25 | End: 2019-09-25 | Stop reason: HOSPADM

## 2019-09-25 RX ORDER — LIDOCAINE HYDROCHLORIDE 10 MG/ML
0.1 INJECTION, SOLUTION EPIDURAL; INFILTRATION; INTRACAUDAL; PERINEURAL AS NEEDED
Status: DISCONTINUED | OUTPATIENT
Start: 2019-09-25 | End: 2019-09-25 | Stop reason: HOSPADM

## 2019-09-25 RX ORDER — ROPIVACAINE HYDROCHLORIDE 5 MG/ML
30 INJECTION, SOLUTION EPIDURAL; INFILTRATION; PERINEURAL AS NEEDED
Status: DISCONTINUED | OUTPATIENT
Start: 2019-09-25 | End: 2019-09-25 | Stop reason: HOSPADM

## 2019-09-25 RX ORDER — GLYCOPYRROLATE 0.2 MG/ML
0.2 INJECTION INTRAMUSCULAR; INTRAVENOUS
Status: DISCONTINUED | OUTPATIENT
Start: 2019-09-25 | End: 2019-09-25 | Stop reason: HOSPADM

## 2019-09-25 RX ORDER — FENTANYL CITRATE 50 UG/ML
50 INJECTION, SOLUTION INTRAMUSCULAR; INTRAVENOUS AS NEEDED
Status: DISCONTINUED | OUTPATIENT
Start: 2019-09-25 | End: 2019-09-25 | Stop reason: HOSPADM

## 2019-09-25 RX ORDER — ALBUTEROL SULFATE 0.83 MG/ML
2.5 SOLUTION RESPIRATORY (INHALATION) AS NEEDED
Status: DISCONTINUED | OUTPATIENT
Start: 2019-09-25 | End: 2019-09-25 | Stop reason: HOSPADM

## 2019-09-25 RX ORDER — ONDANSETRON 2 MG/ML
4 INJECTION INTRAMUSCULAR; INTRAVENOUS AS NEEDED
Status: DISCONTINUED | OUTPATIENT
Start: 2019-09-25 | End: 2019-09-25 | Stop reason: HOSPADM

## 2019-09-25 RX ADMIN — ROPIVACAINE HYDROCHLORIDE 20 ML: 5 INJECTION, SOLUTION EPIDURAL; INFILTRATION; PERINEURAL at 14:28

## 2019-09-25 RX ADMIN — CEFAZOLIN 2 G: 330 INJECTION, POWDER, FOR SOLUTION INTRAMUSCULAR; INTRAVENOUS at 14:50

## 2019-09-25 RX ADMIN — PROPOFOL 50 MCG/KG/MIN: 10 INJECTION, EMULSION INTRAVENOUS at 14:48

## 2019-09-25 RX ADMIN — FENTANYL CITRATE 100 MCG: 50 INJECTION, SOLUTION INTRAMUSCULAR; INTRAVENOUS at 14:39

## 2019-09-25 RX ADMIN — MEPIVACAINE HYDROCHLORIDE 20 ML: 15 INJECTION, SOLUTION EPIDURAL; INFILTRATION at 14:28

## 2019-09-25 RX ADMIN — SODIUM CHLORIDE, POTASSIUM CHLORIDE, SODIUM LACTATE AND CALCIUM CHLORIDE: 600; 310; 30; 20 INJECTION, SOLUTION INTRAVENOUS at 14:39

## 2019-09-25 RX ADMIN — MIDAZOLAM HYDROCHLORIDE 5 MG: 1 INJECTION, SOLUTION INTRAMUSCULAR; INTRAVENOUS at 14:39

## 2019-09-25 RX ADMIN — ACETAMINOPHEN 650 MG: 325 TABLET, FILM COATED ORAL at 14:29

## 2019-09-25 RX ADMIN — SODIUM CHLORIDE, SODIUM LACTATE, POTASSIUM CHLORIDE, AND CALCIUM CHLORIDE 1000 ML: 600; 310; 30; 20 INJECTION, SOLUTION INTRAVENOUS at 14:30

## 2019-09-25 RX ADMIN — PROPOFOL 50 MG: 10 INJECTION, EMULSION INTRAVENOUS at 15:24

## 2019-09-25 RX ADMIN — ROPIVACAINE HYDROCHLORIDE 100 MG: 5 INJECTION, SOLUTION EPIDURAL; INFILTRATION; PERINEURAL at 14:30

## 2019-09-25 RX ADMIN — PROPOFOL 50 MG: 10 INJECTION, EMULSION INTRAVENOUS at 14:48

## 2019-09-25 NOTE — BRIEF OP NOTE
BRIEF OPERATIVE NOTE    Date of Procedure: 9/25/2019   Preoperative Diagnosis: Left Distal Radius Fracture  Postoperative Diagnosis: Left Distal Radius Fracture    Procedure(s):  OPEN REDUCTION INTERNAL FIXATION LEFT DISTAL RADIUS FRACTURE  Surgeon(s) and Role:     Jae Zendejas MD - Primary         Surgical Assistant: Brianne Manning RN    Surgical Staff:  Circ-1: Emmy Aguiar RN  Registered Nurse Assistant: Kash Guido RN  Scrub RN-1: Latasha Motley RN  Event Time In Time Out   Incision Start 1505    Incision Close 1555      Anesthesia: Other   Estimated Blood Loss: <5cc  Specimens: * No specimens in log *   Findings: above - displaced intra-articular fx   Complications: none  Implants:   Implant Name Type Inv.  Item Serial No.  Lot No. LRB No. Used Action   PLATE PROX VDR OLGA ACULOC 2 LT --  - SN/A  PLATE PROX VDR OLGA ACULOC 2 LT --  N/A [employer] N/A Left 1 Implanted   SCR BNE NLCK HEXALOBE 3.5X10MM -- F/ELBOW PLT SYS - SN/A  SCR BNE NLCK HEXALOBE 3.5X10MM -- F/ELBOW PLT SYS N/A [employer] N/A Left 1 Implanted   SCR BNE LCK HEXALOBE 3.5X10MM -- F/ELBOW PLT SYS - SN/A  SCR BNE LCK HEXALOBE 3.5X10MM -- F/ELBOW PLT SYS N/A [employer] N/A Left 1 Implanted   SCR BNE LCK HEXALOBE 3.5X12MM -- F/ELBOW PLT SYS - SN/A  SCR BNE LCK HEXALOBE 3.5X12MM -- F/ELBOW PLT SYS N/A [employer] N/A Left 1 Implanted   SCR BNE BLANE LCK RAYRAY 2.3X18MM --  - SN/A  SCR BNE BLANE LCK RAYRAY 2.3X18MM --  N/A [employer] N/A Left 3 Implanted   SCR BNE BLANE LCK RAYRAY 2.3X20MM --  - SN/A  SCR BNE BLANE LCK RAYRAY 2.3X20MM --  N/A [employer] N/A Left 1 Implanted   PEG BNE BLANE LCK RAYRAY 2.3X18MM --  - SN/A  PEG BNE BLANE LCK RAYRAY 2.3X18MM --  N/A [employer] N/A Left 1 Implanted   SCR BNE BLANE LCK RAYRAY 2.3X22MM --  - SN/A  SCR BNE BLANE LCK RAYRAY 2.3X22MM --  N/A [employer] N/A Left 1 Implanted

## 2019-09-25 NOTE — ROUTINE PROCESS
Patient: Jacek Lewis MRN: [id number]  SSN: [id number] YOB: 1965  Age: 48 y.o. Sex: female Patient is status post Procedure(s): OPEN REDUCTION INTERNAL FIXATION LEFT DISTAL RADIUS FRACTURE. Surgeon(s) and Role: Bobby Ojeda MD - Primary Local/Dose/Irrigation:  See [facility] Peripheral IV 09/25/19 Right Hand (Active) Site Assessment Clean, dry, & intact 9/25/2019  2:31 PM  
Phlebitis Assessment 0 9/25/2019  2:31 PM  
Infiltration Assessment 0 9/25/2019  2:31 PM  
Dressing Status Clean, dry, & intact 9/25/2019  2:31 PM  
Dressing Type Transparent 9/25/2019  2:31 PM  
Hub Color/Line Status Blue 9/25/2019  2:31 PM  
                 
 
 
 
Dressing/Packing:  Wound Arm Left-Dressing Type: Xeroform;4 x 4;Cast padding;Elastic bandage (09/25/19 1500) Splint/Cast: Wound Arm Left-Splint Type/Material: Cast, fiberglass] Other:

## 2019-09-25 NOTE — ANESTHESIA PROCEDURE NOTES
Peripheral Block    Start time: 9/25/2019 2:22 PM  End time: 9/25/2019 2:28 PM  Performed by: Chace Story MD  Authorized by: Chace Story MD       Pre-procedure:    Indications: primary anesthetic    Preanesthetic Checklist: patient identified, risks and benefits discussed, site marked, timeout performed, anesthesia consent given and patient being monitored    Timeout Time: 14:22          Block Type:   Block Type:  Brachial plexus and supraclavicular  Laterality:  Left  Monitoring:  Standard ASA monitoring, continuous pulse ox, frequent vital sign checks, heart rate, oxygen and responsive to questions  Injection Technique:  Single shot  Procedures: nerve stimulator    Patient Position: supine  Prep: chlorhexidine    Location:  Supraclavicular  Needle Type:  Stimuplex  Needle Gauge:  22 G  Needle Localization:  Nerve stimulator    Assessment:  Number of attempts:  1  Injection Assessment:  Incremental injection every 5 mL, negative aspiration for CSF, no paresthesia, no intravascular symptoms and negative aspiration for blood  Patient tolerance:  Patient tolerated the procedure well with no immediate complications

## 2019-09-25 NOTE — ANESTHESIA PREPROCEDURE EVALUATION
Relevant Problems   No relevant active problems       Anesthetic History   No history of anesthetic complications            Review of Systems / Medical History  Patient summary reviewed, nursing notes reviewed and pertinent labs reviewed    Pulmonary  Within defined limits                 Neuro/Psych   Within defined limits      Psychiatric history     Cardiovascular  Within defined limits                Exercise tolerance: >4 METS     GI/Hepatic/Renal  Within defined limits              Endo/Other  Within defined limits           Other Findings              Physical Exam    Airway  Mallampati: II  TM Distance: > 6 cm  Neck ROM: normal range of motion   Mouth opening: Normal     Cardiovascular  Regular rate and rhythm,  S1 and S2 normal,  no murmur, click, rub, or gallop             Dental  No notable dental hx       Pulmonary  Breath sounds clear to auscultation               Abdominal  GI exam deferred       Other Findings            Anesthetic Plan    ASA: 2  Anesthesia type: regional - supraclavicular block          Induction: Intravenous  Anesthetic plan and risks discussed with: Patient

## 2019-09-25 NOTE — H&P
Date of Surgery Update:  Jeramy Reagan was seen and examined. History and physical has been reviewed. The patient has been examined. There have been no significant clinical changes since the completion of the originally dated History and Physical.  Patient identified by surgeon; surgical site was confirmed by patient and surgeon.     Signed By: Karla Pierre MD     September 25, 2019 2:40 PM

## 2019-09-25 NOTE — ANESTHESIA POSTPROCEDURE EVALUATION
Post-Anesthesia Evaluation and Assessment    Patient: Alcides Barlow MRN: [id number]  SSN: [id number]    YOB: 1965  Age: 48 y.o. Sex: female      I have evaluated the patient and they are stable and ready for discharge from the PACU. Cardiovascular Function/Vital Signs  Visit Vitals  BP (!) 118/92   Pulse 61   Temp 36.5 °C (97.7 °F)   Resp 18   Ht 5' 3\" (1.6 m)   Wt 65.8 kg (145 lb)   SpO2 97%   BMI 25.69 kg/m²       Patient is status post Other anesthesia for Procedure(s):  OPEN REDUCTION INTERNAL FIXATION LEFT DISTAL RADIUS FRACTURE. Nausea/Vomiting: None    Postoperative hydration reviewed and adequate. Pain:  Pain Scale 1: Numeric (0 - 10) (09/25/19 1607)  Pain Intensity 1: 0 (09/25/19 1607)   Managed    Neurological Status:   Neuro (WDL): Within Defined Limits (09/25/19 1607)  Neuro  LUE Motor Response: Purposeful (09/25/19 1607)  LLE Motor Response: Purposeful (09/25/19 1607)  RUE Motor Response: Weak;Numbness(pre op block) (09/25/19 1607)  RLE Motor Response: Purposeful (09/25/19 1607)   At baseline    Mental Status, Level of Consciousness: Alert and  oriented to person, place, and time    Pulmonary Status:   O2 Device: Room air (09/25/19 1610)   Adequate oxygenation and airway patent    Complications related to anesthesia: None    Post-anesthesia assessment completed. No concerns    Signed By: Alexandrea Beaulieu MD     September 25, 2019              Procedure(s):  OPEN REDUCTION INTERNAL FIXATION LEFT DISTAL RADIUS FRACTURE.    regional    <BSHSIANPOST>    Vitals Value Taken Time   /81 9/25/2019  4:45 PM   Temp 36.5 °C (97.7 °F) 9/25/2019  4:10 PM   Pulse 57 9/25/2019  4:47 PM   Resp 16 9/25/2019  4:47 PM   SpO2 99 % 9/25/2019  4:47 PM   Vitals shown include unvalidated device data.

## 2019-09-25 NOTE — DISCHARGE INSTRUCTIONS
Dr. Siddiqi Rick Instructions for Elbow/Wrist/Hand Surgery    Medications/Diet  1. Eat only light, non-greasy foods today  2. Take pain medicine with food  3. While taking pain medicines, you may not operate a vehicle, heavy machinery, or appliances  4. While taking pain medicines, you may not drink alcoholic beverages  5. While taking pain medicines, you may not make critical decisions or sign legal papers  6. If you have any reactions to your medicines, stop taking them and call my office immediately  7. Please keep in mind that constipation is a very common side   effect of taking narcotic pain medication. We recommend that patients take precautions to prevent constipation:   Drink plenty of water (6-8 glasses of 8 oz. a day)   Avoid alcohol, caffeine, and dairy products   Eat plenty of fiber (fruits, vegetables and whole grains)   Take an over the counter stool softener (colace or dulcolax)   Patients that have had upper extremity surgery should take frequent walks      Activity/Exercise    1. Exercises are not necessary at this stage, and you will be given exercises at your first post operative visit  2. You are in a splint and should remain in this until your first postoperative visit  3. Please keep ice applied to the wrist for the first 72 hours or as long as pain or swelling persist. Do not apply ice directly to skin, or allow water to leak on your dressing    Dressings/Shower    1. Please keep dressing dry  2. If you have had arthroscopy, you may expect some drainage  3. Please reinforce your dressing with a dry sterile dressing  4. Your dressing will be removed at your first post operative visit  5. You may not shower until after your first post operative visit    Emergency/Follow-Up    1. Please notify my office at [phone] if you develop any fever (101° or above), unexpected warmth, redness or swelling in your hand.  Please call if your fingers become cold, purple, numb, or there is excessive bleeding  2. Please call the office within 24 hours at [phone] ([phone]) to schedule a follow up appointment next week  3. Please call the office before 3pm on Friday if you do not have enough pain medicine for the weekend. Narcotic pain medication cannot be called into your pharmacy and the prescription must be picked up at our office. DISCHARGE SUMMARY from Nurse    PATIENT INSTRUCTIONS:    After general anesthesia or intravenous sedation, for 24 hours or while taking prescription Narcotics:  · Limit your activities  · Do not drive and operate hazardous machinery  · Do not make important personal or business decisions  · Do  not drink alcoholic beverages  · If you have not urinated within 8 hours after discharge, please contact your surgeon on call. Report the following to your surgeon:  · Excessive pain, swelling, redness or odor of or around the surgical area  · Temperature over 100.5  · Nausea and vomiting lasting longer than 4 hours or if unable to take medications  · Any signs of decreased circulation or nerve impairment to extremity: change in color, persistent  numbness, tingling, coldness or increase pain  · Any questions    What to do at Home:  Recommended activity: See surgical instructions,     If you experience any of the following symptoms as instructed, please follow up with Dr Jose Disla. *  Please give a list of your current medications to your Primary Care Provider. *  Please update this list whenever your medications are discontinued, doses are      changed, or new medications (including over-the-counter products) are added. *  Please carry medication information at all times in case of emergency situations. These are general instructions for a healthy lifestyle:    No smoking/ No tobacco products/ Avoid exposure to second hand smoke  Surgeon General's Warning:  Quitting smoking now greatly reduces serious risk to your health.     Obesity, smoking, and sedentary lifestyle greatly increases your risk for illness    A healthy diet, regular physical exercise & weight monitoring are important for maintaining a healthy lifestyle    You may be retaining fluid if you have a history of heart failure or if you experience any of the following symptoms:  Weight gain of 3 pounds or more overnight or 5 pounds in a week, increased swelling in our hands or feet or shortness of breath while lying flat in bed. Please call your doctor as soon as you notice any of these symptoms; do not wait until your next office visit. The discharge information has been reviewed with the patient and spouse. The patient and spouse verbalized understanding. Discharge medications reviewed with the patient and spouse and appropriate educational materials and side effects teaching were provided.   ___________________________________________________________________________________________________________________________________

## 2019-09-26 NOTE — OP NOTES
[address]  OPERATIVE REPORT    Name:  Michael Hollingsworth  MR#:  [id number]  :  1965  ACCOUNT #:  [de-identified]  DATE OF SERVICE:  2019    PREOPERATIVE DIAGNOSIS:  Displaced extra and intraarticular left distal radius fracture. POSTOPERATIVE DIAGNOSIS:  Displaced extra and intraarticular left distal radius fracture. PROCEDURE PERFORMED:  Open reduction and internal volar fixed-angle plate fixation, left distal radius fracture. SURGEON:  Butch Escalona MD    SURGICAL ASSISTANT:  OR Nurse    ANESTHESIA:  Regional nerve block and sedation. COMPLICATIONS:  No complications. SPECIMENS REMOVED:  There were no specimens. IMPLANTS:  Included an Acumed distal volar radial locking plate and screws. ESTIMATED BLOOD LOSS:  Less than 5 mL. DRAINS:  There were no drains placed. TOURNIQUET TIME:  52 minutes, 250 mmHg in the left arm. INDICATIONS:  The patient is a 61-year-old right-hand dominant woman who slipped and fell in her kitchen and sustained a displaced extra and intraarticular left distal radius fracture with disruption of the articular surface with at least two fracture fragments and dorsally angulated at about 30 degrees. She elected to be taken to the operating room for surgical treatment. REPORT OF OPERATION:  The patient was identified, taken into the operating room, placed supine on the operating room table. She received intravenous sedation and monitored care from Anesthesia including a regional nerve block. Her left upper extremity was prepped and draped sterilely. After Esmarch exsanguination, the tourniquet was inflated to 250 mmHg. A 5 to 6 cm longitudinal incision was made to the volar radial margin of the distal forearm extending to the proximal wrist flexion crease. With loupe magnification, skin flaps were elevated. The flexor carpi radialis tendon sheath was incised. The tendon was retracted ulnarly.   Working through the floor of the sheath, the flexor pollicis longus tendon was then retracted ulnarly further protecting the median nerve. The pronator quadratus was already partially released transversely from the fracture site. It was then released radial longitudinally and the muscle was reflected proximal ulnarly. This was a T-type fracture with a metaphyseal extra-articular component and a complete split of the scaphoid and lunate fossa of the distal radius. The fracture fragments were directed about 30 degrees dorsally angulated and significantly displaced. Once the interposed periosteum muscle was removed, the fracture fragments were reduced and interdigitated well. They were held temporarily with two 0.054 Jose Ramon pins passed to the radial styloid. Definitive fixation was provided with a narrow-width proximal 3-hole plate from the Acumed distal volar radial locking plate and screw system. A narrow-width plate was applied to the volar surface of the distal radius. This was placed with a 3.5 mm cortical screw passed through the proximal oblong hole predrilled with a 2.8 mm drill bit. The plate was adjusted as needed under FluoroScan guidance. Then, a series of five 2.3 mm locking screws were placed distally. These were predrilled with a 2.0 drill bit through the snap-on drill guide. Two additional 3.5 mm cortical locking screws were placed proximally to complete the construct. The pins were removed. There was recovery of radial length inclination and volar tilt and a good reduction of the articular surface. There was still some dorsal comminution but unrelated to the articular surface. There was a tiny ulnar styloid fracture but the distal radial ulnar joint looked stable. No further treatment was needed. The wound was copiously irrigated with saline. Final FluoroScan images were obtained.   The wound was closed with saline with bacitracin irrigation of the wound followed by pronator quadratus repair centrally over the plate with 3-0 Vicryl suture. The subcutaneous layer was closed with 3-0 Vicryl and the skin with 4-0 nylon horizontal mattress sutures. Soft dressings were applied followed by resting splint. The tourniquet was released. The hand was pink and had good refill. She was transported into the recovery room postoperatively in good condition.       MD FELICITAS [id number]  D:  09/25/2019 16:15  T:  09/26/2019 2:47  JOB #:  [id number]

## 2019-10-15 ENCOUNTER — OFFICE VISIT (OUTPATIENT)
Dept: INTERNAL MEDICINE CLINIC | Age: 54
End: 2019-10-15

## 2019-10-15 VITALS
BODY MASS INDEX: 25.73 KG/M2 | HEIGHT: 63 IN | OXYGEN SATURATION: 97 % | WEIGHT: 145.2 LBS | SYSTOLIC BLOOD PRESSURE: 110 MMHG | HEART RATE: 75 BPM | DIASTOLIC BLOOD PRESSURE: 80 MMHG | RESPIRATION RATE: 18 BRPM | TEMPERATURE: 98.1 F

## 2019-10-15 DIAGNOSIS — R91.1 LUNG NODULE: Primary | ICD-10-CM

## 2019-10-15 DIAGNOSIS — Z12.11 COLON CANCER SCREENING: ICD-10-CM

## 2019-10-15 NOTE — PROGRESS NOTES
Comprehensive Physical Examination    Cedrick Langford is a 48 y.o. female. she presents for a comprehensive physical examination. She had a fracture of her left wrist.  Had surgery and improving--Dr. Kwaku Junior. Had colonoscopy at age 54 (she is not entirely sure of this). She needs to see gynecology. She has seen Dr. Sia Brantley in the past.  She has had abnormal pap smears in the past.  No recent evaluations. She has had a lung nodule in the past.  Referred to pulmonology but she has not followed up recently. Discussed vaccinations. She will consider these        Patient Active Problem List    Diagnosis Date Noted    Closed fracture of distal end of left radius, initial encounter 09/25/2019    Menopause     Dyspareunia      Current Outpatient Medications   Medication Sig Dispense Refill    gabapentin (NEURONTIN) 100 mg capsule Take 1 Cap by mouth three (3) times daily. Max Daily Amount: 300 mg. 90 Cap 0    MULTIVITAMIN PO Take  by mouth daily.  ondansetron (ZOFRAN ODT) 4 mg disintegrating tablet Take 1 Tab by mouth every eight (8) hours as needed for Nausea. 9 Tab 0    aspirin delayed-release 81 mg tablet Take  by mouth daily.  fluticasone-salmeterol (ADVAIR) 500-50 mcg/dose diskus inhaler Take 1 Puff by inhalation two (2) times a day. 1 Inhaler 0    CALCIUM CARBONATE/VITAMIN D3 (CALCIUM CHEW PO) Take  by mouth daily.        Allergies   Allergen Reactions    Pcn [Penicillins] Other (comments)     Patient reports an reaction about 8 years ago       Past Medical History:   Diagnosis Date    Dyspareunia     Ill-defined condition     superficial blood clots right leg    Lung nodule     Menopause     age 47    Pericarditis, acute      Past Surgical History:   Procedure Laterality Date    HX ANKLE FRACTURE TX Left     HX BREAST BIOPSY      can't remember which side needle benign    HX WRIST FRACTURE TX Left 2019     Family History   Problem Relation Age of Onset    Cancer Mother lung    Cancer Father         salivary     Heart Disease Father     Lung Disease Father      Social History     Tobacco Use    Smoking status: Never Smoker    Smokeless tobacco: Never Used   Substance Use Topics    Alcohol use: No        Health Maintenance   Topic Date Due    Shingrix Vaccine Age 50> (1 of 2) 07/28/2017    FOBT Q 1 YEAR AGE 50-75  07/28/2017    PAP AKA CERVICAL CYTOLOGY  02/26/2019    Influenza Age 5 to Adult  08/01/2019    BREAST CANCER SCRN MAMMOGRAM  05/23/2021    DTaP/Tdap/Td series (2 - Td) 02/08/2026    Pneumococcal 0-64 years  Aged Out         Review of Systems   Constitutional: Negative. HENT: Negative. Eyes: Negative. Cardiovascular: Negative. Visit Vitals  /80 (BP 1 Location: Right arm, BP Patient Position: Sitting)   Pulse 75   Temp 98.1 °F (36.7 °C) (Oral)   Resp 18   Ht 5' 3\" (1.6 m)   Wt 145 lb 3.2 oz (65.9 kg)   SpO2 97%   BMI 25.72 kg/m²       Physical Exam   Constitutional: She is oriented to person, place, and time. No distress. Cardiovascular: Normal rate and regular rhythm. Pulmonary/Chest: Effort normal and breath sounds normal.   Neurological: She is alert and oriented to person, place, and time. ASSESSMENT/PLAN  Diagnoses and all orders for this visit:    1. Lung nodule  -     REFERRAL TO PULMONARY DISEASE    2. Colon cancer screening  -     REFERRAL TO GASTROENTEROLOGY      Follow-up and Dispositions    · Return in about 6 months (around 4/15/2020).

## 2022-01-05 ENCOUNTER — TELEPHONE (OUTPATIENT)
Dept: INTERNAL MEDICINE CLINIC | Age: 57
End: 2022-01-05

## 2022-01-05 NOTE — TELEPHONE ENCOUNTER
Contacted patient and confirmed fax number for Dr Cande Hogan. All CT and xrays in chart for chest faxed over as requested.  Fax number [phone]

## 2022-01-05 NOTE — TELEPHONE ENCOUNTER
----- Message from Rolando Kim sent at 1/5/2022  1:44 PM EST -----  Subject: Message to Provider    QUESTIONS  Information for Provider? Patient needs all X-rays from 2017-present faxed   over to Dr. Jared Alston office. Called office to see if they have Xrays rep   stated no they don't keep those records in office. I stated she might have   to go through Medical Records to get that taken care off, can someone   direct her to medical records if she calls again, because she didn't want   me to get her over there, and she has gotten x-rays done at a a lot of   facilities so she doesn't know where they are.   ---------------------------------------------------------------------------  --------------  CALL BACK INFO  What is the best way for the office to contact you? OK to leave message on   voicemail  Preferred Call Back Phone Number? [phone]  ---------------------------------------------------------------------------  --------------  SCRIPT ANSWERS  Relationship to Patient?  Self

## 2022-03-19 PROBLEM — S52.502A CLOSED FRACTURE OF DISTAL END OF LEFT RADIUS, INITIAL ENCOUNTER: Status: ACTIVE | Noted: 2019-09-25

## 2022-11-17 ENCOUNTER — TRANSCRIBE ORDER (OUTPATIENT)
Dept: SCHEDULING | Age: 57
End: 2022-11-17

## 2022-11-17 DIAGNOSIS — R91.1 LUNG NODULE: Primary | ICD-10-CM

## 2023-04-23 DIAGNOSIS — R91.1 LUNG NODULE: Primary | ICD-10-CM

## (undated) DEVICE — 2.8MM X 5" QUICK RELEASE DRILL: Brand: ACUMED

## (undated) DEVICE — (D)PREP SKN CHLRAPRP APPL 26ML -- CONVERT TO ITEM 371833

## (undated) DEVICE — HANDLE LT SNAP ON ULT DURABLE LENS FOR TRUMPF ALC DISPOSABLE

## (undated) DEVICE — SUTURE VCRL SZ 3-0 L18IN ABSRB UD PS-2 L19MM 1/2 CIR J497G

## (undated) DEVICE — Device

## (undated) DEVICE — SUT ETHLN 4-0 18IN PS2 BLK --

## (undated) DEVICE — SPONGE GZ W4XL4IN COT 12 PLY TYP VII WVN C FLD DSGN

## (undated) DEVICE — PADDING CST CRMPD 3INX4YD NS --

## (undated) DEVICE — STERILE POLYISOPRENE POWDER-FREE SURGICAL GLOVES: Brand: PROTEXIS

## (undated) DEVICE — SOLUTION IV 1000ML 0.9% SOD CHL

## (undated) DEVICE — BANDAGE,GAUZE,BULKEE II,2.25"X3YD,STRL: Brand: MEDLINE

## (undated) DEVICE — 2.3MM X 22MM LOCKING CORTICAL SCREW
Type: IMPLANTABLE DEVICE | Status: NON-FUNCTIONAL
Brand: ACUMED
Removed: 2019-09-25

## (undated) DEVICE — DRESSING,GAUZE,XEROFORM,CURAD,1"X8",ST: Brand: CURAD

## (undated) DEVICE — BANDAGE COMPR 9 FTX4 IN SMOOTH COMFORTABLE SYNTH ESMRK LF

## (undated) DEVICE — SPLINT THMB W4XL15IN FBRGLS PD PRECUT LTWT DURABLE FAST SET

## (undated) DEVICE — DRAPE,REIN 53X77,STERILE: Brand: MEDLINE

## (undated) DEVICE — 2.0MM QUICK RELEASE DRILL: Brand: ACUMED

## (undated) DEVICE — DRAPE XR C ARM 41X74IN LF --

## (undated) DEVICE — BIPOLAR FORCEPS CORD: Brand: VALLEYLAB

## (undated) DEVICE — INFECTION CONTROL KIT SYS

## (undated) DEVICE — DISPOSABLE TOURNIQUET CUFF SINGLE BLADDER, DUAL PORT AND QUICK CONNECT CONNECTOR: Brand: COLOR CUFF

## (undated) DEVICE — .054" X 6" GUIDE WIRE: Brand: ACUMED

## (undated) DEVICE — GARMENT,MEDLINE,DVT,INT,CALF,MED, GEN2: Brand: MEDLINE